# Patient Record
Sex: FEMALE | Race: WHITE
[De-identification: names, ages, dates, MRNs, and addresses within clinical notes are randomized per-mention and may not be internally consistent; named-entity substitution may affect disease eponyms.]

---

## 2019-04-06 ENCOUNTER — HOSPITAL ENCOUNTER (EMERGENCY)
Dept: HOSPITAL 80 - FED | Age: 24
Discharge: HOME | End: 2019-04-06
Payer: COMMERCIAL

## 2019-04-06 VITALS — SYSTOLIC BLOOD PRESSURE: 124 MMHG | DIASTOLIC BLOOD PRESSURE: 72 MMHG

## 2019-04-06 DIAGNOSIS — R11.2: Primary | ICD-10-CM

## 2019-04-06 LAB — PLATELET # BLD: 363 10^3/UL (ref 150–400)

## 2019-04-06 NOTE — EDPHY
H & P


Stated Complaint: ABD PAIN,N/V,FEVERS,CHILLS X 1 DAY


Time Seen by Provider: 04/06/19 19:51


HPI/ROS: 





CHIEF COMPLAINT:  Nausea vomiting





HISTORY OF PRESENT ILLNESS:  23-year-old female positive heavy alcohol use last 

evening awoke at 8:00 a.m. Today complaining of nausea vomiting which has been 

intractable ever since.  No abdominal pain.  No fever or chills.  No back or 

flank pain.  No urinary abnormality.  No history of chronic abdominal pathology 

or abdominal surgeries.








REVIEW OF SYSTEMS:


10 systems reviewed and negative with the exception of the elements mentioned 

in the history of present illness








PAST MEDICAL & SURGICAL  HISTORY:  No pertinent medical or surgical history    





SOCIAL HISTORY:Positive for heavy alcohol use last evening     











************


PHYSICAL EXAM





(Prior to examination, patient consented to physical exam, hands were washed 

and my usual and customary physical exam procedures followed)


1) GENERAL: Well-developed, well-nourished, alert and oriented.  Appears 

uncomfortable, retching


2) HEAD: Normocephalic, atraumatic


3) HEENT: Pupils equal, round, reactive to light bilaterally.  Sclera 

anicteric.  Nasopharynx, oropharynx, clear, no lesions.  Dry mucous membranes.


4) NECK: Full range of motion, no meningeal signs.


5) LUNGS: Clear auscultation bilaterally, no wheezes, no rhonchi, no 

retractions.   


6) HEART: Regular rate and rhythm, no murmur, no heave, no gallop.


7) ABDOMEN: No guarding, no rebound, no focal tenderness, negative McBurney's, 

negative Vela's, negative Rovsing's, negative peritoneal sign, I am unable to 

elicit any abdominal pain on exam


8) MUSCULOSKELETAL: Moving all extremities, no focal areas of tenderness, no 

obvious trauma.  No peripheral edema or discoloration.


9) BACK: No CVA tenderness, no midline vertebral tenderness, no fluctuance, no 

step-off, no obvious trauma, no visual or palpable abnormality. 


10) SKIN: No rash, no petechiae. 


11) Psychiatric:  Patient is oriented X 3, there is no agitation.








***************





DIFFERENTIAL DIAGNOSIS:  My differential diagnosis includes, but is not limited 

to, acute appendicitis, acute alcohol withdrawal, cyclic vomiting, acute 

cholecystitis, bowel obstruction, acute pancreatitis, ovarian torsion, ectopic 

pregnancy, gastritis and urinary tract infection.  The patient understands that 

this diagnosis is provisional and can never be 100% accurate.  This is a 

partial list of diagnoses considered. These considerations are based on history

, physical exam, past history and reassessment. 








- Personal History


LMP (Females 10-55): 15-21 Days Ago


Current Tetanus Diphtheria and Acellular Pertussis (TDAP): Yes





- Medical/Surgical History


Hx Asthma: No


Hx Chronic Respiratory Disease: No


Hx Diabetes: No


Hx Cardiac Disease: No


Hx Renal Disease: No


Hx Cirrhosis: No


Hx Alcoholism: No


Hx HIV/AIDS: No


Hx Splenectomy or Spleen Trauma: No


Other PMH: DENIES





- Social History


Smoking Status: Never smoked


Constitutional: 


 Initial Vital Signs











Temperature (C)  36.7 C   04/06/19 19:40


 


Heart Rate  100   04/06/19 19:40


 


Respiratory Rate  18   04/06/19 19:40


 


Blood Pressure  138/82 H  04/06/19 19:40


 


O2 Sat (%)  98   04/06/19 19:40








 











O2 Delivery Mode               Room Air














Allergies/Adverse Reactions: 


 





No Known Allergies Allergy (Unverified 04/06/19 19:39)


 








Home Medications: 














 Medication  Instructions  Recorded


 


Ondansetron Odt [Zofran Odt] 4 mg PO Q4PRN PRN #10 tab 04/06/19


 


VYVANSE  04/06/19














Medical Decision Making





- Data Points


Laboratory Results: 


 Laboratory Results





 04/06/19 19:55 





 04/06/19 19:55 





 











  04/06/19 04/06/19 04/06/19





  19:55 19:55 19:55


 


WBC      10.91 10^3/uL H 10^3/uL





     (3.80-9.50) 


 


RBC      5.35 10^6/uL H 10^6/uL





     (4.18-5.33) 


 


Hgb      15.6 g/dL g/dL





     (12.6-16.3) 


 


Hct      46.8 % %





     (38.0-47.0) 


 


MCV      87.5 fL fL





     (81.5-99.8) 


 


MCH      29.2 pg pg





     (27.9-34.1) 


 


MCHC      33.3 g/dL g/dL





     (32.4-36.7) 


 


RDW      12.2 % %





     (11.5-15.2) 


 


Plt Count      363 10^3/uL 10^3/uL





     (150-400) 


 


MPV      10.0 fL fL





     (8.7-11.7) 


 


Neut % (Auto)      74.0 % %





     (39.3-74.2) 


 


Lymph % (Auto)      17.0 % %





     (15.0-45.0) 


 


Mono % (Auto)      7.9 % %





     (4.5-13.0) 


 


Eos % (Auto)      0.4 % L %





     (0.6-7.6) 


 


Baso % (Auto)      0.4 % %





     (0.3-1.7) 


 


Nucleat RBC Rel Count      0.0 % %





     (0.0-0.2) 


 


Absolute Neuts (auto)      8.09 10^3/uL H 10^3/uL





     (1.70-6.50) 


 


Absolute Lymphs (auto)      1.85 10^3/uL 10^3/uL





     (1.00-3.00) 


 


Absolute Monos (auto)      0.86 10^3/uL H 10^3/uL





     (0.30-0.80) 


 


Absolute Eos (auto)      0.04 10^3/uL 10^3/uL





     (0.03-0.40) 


 


Absolute Basos (auto)      0.04 10^3/uL 10^3/uL





     (0.02-0.10) 


 


Absolute Nucleated RBC      0.00 10^3/uL 10^3/uL





     (0-0.01) 


 


Immature Gran %      0.3 % %





     (0.0-1.1) 


 


Immature Gran #      0.03 10^3/uL 10^3/uL





     (0.00-0.10) 


 


Sodium    143 mEq/L mEq/L  





    (135-145)  


 


Potassium    3.8 mEq/L mEq/L  





    (3.5-5.2)  


 


Chloride    105 mEq/L mEq/L  





    ()  


 


Carbon Dioxide    23 mEq/l mEq/l  





    (22-31)  


 


Anion Gap    15 mEq/L H mEq/L  





    (6-14)  


 


BUN    12 mg/dL mg/dL  





    (7-23)  


 


Creatinine    0.7 mg/dL mg/dL  





    (0.6-1.0)  


 


Estimated GFR    > 60   





    


 


Glucose    85 mg/dL mg/dL  





    ()  


 


Calcium    9.9 mg/dL mg/dL  





    (8.5-10.4)  


 


Total Bilirubin    0.5 mg/dL mg/dL  





    (0.1-1.4)  


 


Conjugated Bilirubin    0.2 mg/dL mg/dL  





    (0.0-0.5)  


 


Unconjugated Bilirubin    0.3 mg/dL mg/dL  





    (0.0-1.1)  


 


AST    27 IU/L IU/L  





    (14-46)  


 


ALT    32 IU/L IU/L  





    (9-52)  


 


Alkaline Phosphatase    119 IU/L IU/L  





    ()  


 


Total Protein    8.6 g/dL H g/dL  





    (6.3-8.2)  


 


Albumin    5.0 g/dL g/dL  





    (3.5-5.0)  


 


Lipase    162 IU/L IU/L  





    ()  


 


Beta HCG, Qual  NEGATIVE     





    











Medications Given: 


 








Discontinued Medications





Lorazepam (Ativan Injection)  1 mg IVP EDNOW ONE


   Stop: 04/06/19 19:57


   Last Admin: 04/06/19 20:01 Dose:  1 mg


Ondansetron HCl (Zofran)  4 mg IVP EDNOW ONE


   Stop: 04/06/19 19:57


   Last Admin: 04/06/19 20:01 Dose:  4 mg








Departure





- Departure


Disposition: Home, Routine, Self-Care


Clinical Impression: 


 Nausea & vomiting





Condition: Good


Instructions:  Acute Nausea and Vomiting (ED)


Additional Instructions: 


Please exercise caution with alcohol use in the future.


Referrals: 


[facility],. [Clinic] - 1-2 days without fail


Prescriptions: 


Ondansetron Odt [Zofran Odt] 4 mg PO Q4PRN PRN #10 tab


 PRN Reason: Nausea